# Patient Record
Sex: MALE | Race: WHITE | NOT HISPANIC OR LATINO
[De-identification: names, ages, dates, MRNs, and addresses within clinical notes are randomized per-mention and may not be internally consistent; named-entity substitution may affect disease eponyms.]

---

## 2019-12-24 ENCOUNTER — RESULT REVIEW (OUTPATIENT)
Age: 54
End: 2019-12-24

## 2020-01-02 PROBLEM — Z00.00 ENCOUNTER FOR PREVENTIVE HEALTH EXAMINATION: Status: ACTIVE | Noted: 2020-01-02

## 2020-01-07 ENCOUNTER — RESULT REVIEW (OUTPATIENT)
Age: 55
End: 2020-01-07

## 2020-01-07 ENCOUNTER — APPOINTMENT (OUTPATIENT)
Dept: HEMATOLOGY ONCOLOGY | Facility: CLINIC | Age: 55
End: 2020-01-07
Payer: COMMERCIAL

## 2020-01-07 VITALS
SYSTOLIC BLOOD PRESSURE: 126 MMHG | WEIGHT: 164.99 LBS | BODY MASS INDEX: 24.72 KG/M2 | OXYGEN SATURATION: 95 % | DIASTOLIC BLOOD PRESSURE: 76 MMHG | HEIGHT: 68.7 IN | RESPIRATION RATE: 16 BRPM | HEART RATE: 79 BPM | TEMPERATURE: 98.1 F

## 2020-01-07 DIAGNOSIS — Z82.49 FAMILY HISTORY OF ISCHEMIC HEART DISEASE AND OTHER DISEASES OF THE CIRCULATORY SYSTEM: ICD-10-CM

## 2020-01-07 DIAGNOSIS — Z87.01 PERSONAL HISTORY OF PNEUMONIA (RECURRENT): ICD-10-CM

## 2020-01-07 DIAGNOSIS — Z81.1 FAMILY HISTORY OF ALCOHOL ABUSE AND DEPENDENCE: ICD-10-CM

## 2020-01-07 DIAGNOSIS — Z78.9 OTHER SPECIFIED HEALTH STATUS: ICD-10-CM

## 2020-01-07 DIAGNOSIS — Z82.61 FAMILY HISTORY OF ARTHRITIS: ICD-10-CM

## 2020-01-07 PROCEDURE — 99215 OFFICE O/P EST HI 40 MIN: CPT

## 2020-01-07 RX ORDER — BUDESONIDE AND FORMOTEROL FUMARATE DIHYDRATE 160; 4.5 UG/1; UG/1
160-4.5 AEROSOL RESPIRATORY (INHALATION)
Refills: 0 | Status: ACTIVE | COMMUNITY

## 2020-01-10 ENCOUNTER — RESULT REVIEW (OUTPATIENT)
Age: 55
End: 2020-01-10

## 2020-01-13 ENCOUNTER — RESULT REVIEW (OUTPATIENT)
Age: 55
End: 2020-01-13

## 2020-01-13 ENCOUNTER — APPOINTMENT (OUTPATIENT)
Dept: HEMATOLOGY ONCOLOGY | Facility: CLINIC | Age: 55
End: 2020-01-13
Payer: COMMERCIAL

## 2020-01-13 VITALS
OXYGEN SATURATION: 96 % | HEIGHT: 68.7 IN | WEIGHT: 160.39 LBS | RESPIRATION RATE: 16 BRPM | SYSTOLIC BLOOD PRESSURE: 116 MMHG | TEMPERATURE: 97.9 F | HEART RATE: 74 BPM | BODY MASS INDEX: 24.03 KG/M2 | DIASTOLIC BLOOD PRESSURE: 79 MMHG

## 2020-01-13 PROCEDURE — 99214 OFFICE O/P EST MOD 30 MIN: CPT

## 2020-01-13 NOTE — ASSESSMENT
[FreeTextEntry1] : 54 year male with locally advanced urothelial cancer with congenital solitary kidney.\par \par Pathology report revealed in the right upper lobe fragments of papillary high grade urothelial cancer, PDL-1 pending \par \par CT chest with IV contrast Dec 20, 2019 with widespread bronchiectasis and 8 mm nodule in the RLL.\par \par MRI +/- gadolinium Dec 30, 2019 with right kidney moderate hydronephrosis, no adenopathy.  \par Patient presented with rectal bleeding today.  PLan for colonoscopy.  Restaging PETCT in view of bleeding and solitary pulmonary nodule. \par \par Genetic testing - send to [employer] [facility], pending results.\par \par Mediport placed- healing well. \par \par Patient scheduled to start neoadjuvant MVACdd  today, cycle 1, presented with short episode of rectal bleeding today.\par Colonoscopy scheduled for today - postpone chemotherapy. \par \par PETCT for solitary lung nodule. \par \par \par

## 2020-01-13 NOTE — REVIEW OF SYSTEMS
[Negative] : Allergic/Immunologic [FreeTextEntry7] : rectal bleeding - yesterday, 3 episodes of diarrhea, loose

## 2020-01-13 NOTE — HISTORY OF PRESENT ILLNESS
[de-identified] : 54 year old male presents today for hospital follow up of high grade urothelial cancer.   \par \par Pathology of upper pole biopsy right renal lesion on 12/31/19- superficial fragments of a papillary high grade urothelial carcinoma.   [de-identified] : Patient states he is feeling well. Port placed this Friday 1/10/2020. Patient states he had blood in his stool over the weekend with some clotting as well - PCP recommended to delay chemotherapy. Colonoscopy scheduled for this afternoon. Last colonoscopy done with patient was 50.

## 2020-01-23 ENCOUNTER — RESULT REVIEW (OUTPATIENT)
Age: 55
End: 2020-01-23

## 2020-01-23 ENCOUNTER — APPOINTMENT (OUTPATIENT)
Dept: HEMATOLOGY ONCOLOGY | Facility: CLINIC | Age: 55
End: 2020-01-23
Payer: COMMERCIAL

## 2020-01-23 VITALS
WEIGHT: 159.99 LBS | HEIGHT: 68.7 IN | OXYGEN SATURATION: 98 % | RESPIRATION RATE: 16 BRPM | DIASTOLIC BLOOD PRESSURE: 78 MMHG | TEMPERATURE: 97.9 F | BODY MASS INDEX: 23.97 KG/M2 | HEART RATE: 92 BPM | SYSTOLIC BLOOD PRESSURE: 123 MMHG

## 2020-01-23 DIAGNOSIS — K62.5 HEMORRHAGE OF ANUS AND RECTUM: ICD-10-CM

## 2020-01-23 PROCEDURE — 99215 OFFICE O/P EST HI 40 MIN: CPT

## 2020-01-24 PROBLEM — K62.5 RECTAL BLEEDING: Status: ACTIVE | Noted: 2020-01-13

## 2020-01-24 NOTE — ASSESSMENT
[FreeTextEntry1] : 55 yo male with congenital solitary kidney diagnosed in December 2019 with high grade urothelial cancer of renal pelvis.  We reviewed current data indication benefit from adjuvant approach in high grade upper urothelial cancer. \par \par CT scan and MRI reviewed. \par Patient with solitary lung nodule - will follow up with PETCT\par \par Genetic testing ordered, patient with unknown family history on paternal side.\par \par Reviewed with patient ddMVAC protocol\par ECHO reviewed.\par Mediport placement.\par Side effects and schema of the treatment reviewed with patient and his wife.\par \par Stressed the importance of hydration throughout the treatment - patient's PCP Dr. Igor Gandhi will arrange for hydration at home. \par

## 2020-01-24 NOTE — HISTORY OF PRESENT ILLNESS
[de-identified] : 54 year old male presents today for hospital follow up of high grade urothelial cancer.   \par \par Pathology of upper pole biopsy right renal lesion on 12/31/19- superficial fragments of a papillary high grade urothelial carcinoma.   [de-identified] : Patient experienced hematuria this morning at 0700. Wife states that he also had scant hematuria yesterday, but patient denies. Patient has right flank pain since yesterday evening. Will be seeing Dr. Garcia today.\par \par Uroscopy done last Tuesday.

## 2020-01-24 NOTE — ASSESSMENT
[FreeTextEntry1] : 54 year male with locally advanced urothelial cancer with congenital solitary kidney.\par \par Pathology report revealed in the right upper lobe fragments of papillary high grade urothelial cancer, PDL-1 pending \par \par CT chest with IV contrast Dec 20, 2019 with widespread bronchiectasis and 8 mm nodule in the RLL.\par Colonoscopy Jan 2020- BRIDGER \par MRI +/- gadolinium Dec 30, 2019 with right kidney moderate hydronephrosis, no adenopathy.  \par \par [facility] [place] [facility] [place]\par \par \par Neoadjuvant MVACdd  cycle 1, day 7 \par - episode of hematuria\par - right flank pain - patient with stent, hydration, Valium and plan to see Dr. Garcia\par -  continue hydration \par - labs reviewed- stable kidney function \par \par PETCT for solitary lung nodule. \par \par \par

## 2020-01-24 NOTE — HISTORY OF PRESENT ILLNESS
[de-identified] : 54 year old male presents today for hospital follow up of high grade urothelial cancer.   \par \par Pathology of upper pole biopsy right renal lesion on 12/31/19- superficial fragments of a papillary high grade urothelial carcinoma.  \par Patient has congenital solitary kidney and presented with hematuria.  Cytology revealed high- grade urothelial cancer.  Ureteroscopy of the right kidney showed right upper lobe lesion, patient underwent pigtail placement following fulguration of the lesion. \par Patient desires preservation of the solitary kidney and is a candidate for neoadjuvant treatment with ddMVAC.

## 2020-01-30 ENCOUNTER — APPOINTMENT (OUTPATIENT)
Dept: HEMATOLOGY ONCOLOGY | Facility: CLINIC | Age: 55
End: 2020-01-30
Payer: COMMERCIAL

## 2020-01-30 ENCOUNTER — RESULT REVIEW (OUTPATIENT)
Age: 55
End: 2020-01-30

## 2020-01-30 VITALS
OXYGEN SATURATION: 96 % | WEIGHT: 159 LBS | BODY MASS INDEX: 23.82 KG/M2 | HEIGHT: 68.7 IN | TEMPERATURE: 98.1 F | HEART RATE: 98 BPM | RESPIRATION RATE: 16 BRPM | SYSTOLIC BLOOD PRESSURE: 100 MMHG | DIASTOLIC BLOOD PRESSURE: 67 MMHG

## 2020-01-30 VITALS
BODY MASS INDEX: 23.82 KG/M2 | DIASTOLIC BLOOD PRESSURE: 74 MMHG | HEIGHT: 68.7 IN | OXYGEN SATURATION: 96 % | HEART RATE: 98 BPM | SYSTOLIC BLOOD PRESSURE: 112 MMHG | TEMPERATURE: 98.1 F | WEIGHT: 159 LBS | RESPIRATION RATE: 16 BRPM

## 2020-01-30 DIAGNOSIS — Z87.440 PERSONAL HISTORY OF URINARY (TRACT) INFECTIONS: ICD-10-CM

## 2020-01-30 PROCEDURE — 99214 OFFICE O/P EST MOD 30 MIN: CPT

## 2020-01-31 NOTE — ASSESSMENT
[FreeTextEntry1] : 54 year male with locally advanced urothelial cancer with congenital solitary kidney.\par \par 12/2019Pathology report revealed in the right upper lobe fragments of papillary high grade urothelial cancer, PDL-1 pending \par \par CT chest with IV contrast Dec 20, 2019 with widespread bronchiectasis and 8 mm nodule in the RLL.\par Colonoscopy Jan 2020- BRIGDER \par MRI +/- gadolinium Dec 30, 2019 with right kidney moderate hydronephrosis, no adenopathy.  \par \par [facility] [place] [facility] [place]\par \par Neoadjuvant started 1/13/20 - MVACdd - now on cycle 2\par - hematuria - resolved - home hydration post treatment.\par -1/24/20 UTI - continue with Augmentin\par -Anemia - 2/2 hematuria - s/p  PRBCs transfusion 1/26/20 - Hgb improved. \par - labs reviewed- stable kidney function \par -to have PEt/Ct scan done\par -Ativan called into local pharmacy for anxiety/nausea\par \par rtc in 1 wk for follow up.\par Case and management discussed with Dr. Mcelroy\par \par \par

## 2020-01-31 NOTE — HISTORY OF PRESENT ILLNESS
[de-identified] : Mr. 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   [de-identified] : Pt is here for 2nd treatment with MVAC for urethrial cancer with his wife. Pt was hospitalized at [facility] from 1/24/ to 1/29 for hematuria, worsening of anemia from hematuria, and UTI (Klebsiella pneu). Pt  received 2 units of PRBCs, hematuria resolved, and now on Augmentin for UTI. Pt states his breathing is slightly more labored (pt has bronchiectasis) due to lack of physical activity and mobility because it worsens his hematuria.  Last gross hematuria was 5 days ago. Pt also had a few days of nausea after last treatment that improved with home IVH. He is currently has not taking Valium for anxiety, would like to start Ativan since it's also a great antiemetic.

## 2020-01-31 NOTE — REVIEW OF SYSTEMS
[Negative] : Allergic/Immunologic [Recent Change In Weight] : ~T recent weight change [SOB on Exertion] : shortness of breath during exertion [FreeTextEntry6] : + [FreeTextEntry9] : +walking with walker

## 2020-02-06 ENCOUNTER — APPOINTMENT (OUTPATIENT)
Dept: HEMATOLOGY ONCOLOGY | Facility: CLINIC | Age: 55
End: 2020-02-06

## 2020-02-13 ENCOUNTER — APPOINTMENT (OUTPATIENT)
Dept: HEMATOLOGY ONCOLOGY | Facility: CLINIC | Age: 55
End: 2020-02-13
Payer: COMMERCIAL

## 2020-02-13 VITALS
HEART RATE: 91 BPM | RESPIRATION RATE: 18 BRPM | BODY MASS INDEX: 23.84 KG/M2 | DIASTOLIC BLOOD PRESSURE: 77 MMHG | HEIGHT: 68.7 IN | WEIGHT: 159.1 LBS | SYSTOLIC BLOOD PRESSURE: 121 MMHG | OXYGEN SATURATION: 98 % | TEMPERATURE: 98.2 F

## 2020-02-13 DIAGNOSIS — R31.9 HEMATURIA, UNSPECIFIED: ICD-10-CM

## 2020-02-13 PROCEDURE — 99214 OFFICE O/P EST MOD 30 MIN: CPT

## 2020-02-14 PROBLEM — R31.9: Status: ACTIVE | Noted: 2020-01-24

## 2020-02-14 NOTE — ASSESSMENT
[FreeTextEntry1] : 54 year male with locally advanced urothelial cancer with congenital solitary kidney.\par \par 12/2019Pathology report revealed in the right upper lobe fragments of papillary high grade urothelial cancer, PDL-1 pending \par \par CT chest with IV contrast Dec 20, 2019 with widespread bronchiectasis and 8 mm nodule in the RLL.\par Colonoscopy Jan 2020- BRIDGER \par MRI +/- gadolinium Dec 30, 2019 with right kidney moderate hydronephrosis, no adenopathy.  \par \par Genetic testing - send to [facility] in [place] and [place]\par \par Neoadjuvant started 1/13/20 - MVACdd - s/p cycle 2\par \par - complicated with hematuria, pseudoaneurysm, required embolization, followed by acute renal failure- Cr 6, now improved to 1.44 \par -to have PEt/Ct scan to be done, next week\par -if renal function continues to recover will proceed with cycle 3 and 4 of MVAC\par reviewed with patient protocol for neoadjuvant chemo in upper urothelial cancer.  He has congenital solitary kidney  and faces dialysis for at least 1 to 3 years before he can be considered for transplant.\par Following the chemotherapy he will undergo scope to evaluate response.  If CR can be considered for maintenance with immunotherapy although I stressed to the patient and that there is no randomized data and we can consider this based of experience with several patients in our institution. If there is residual disease nephroureterectomy is indicated. \par \par \par

## 2020-02-14 NOTE — HISTORY OF PRESENT ILLNESS
[de-identified] : Pt is here for 2nd treatment with MVAC for urethrial cancer with his wife and son.  Patient discharged from ICU this Saturday 2/8/2020 for hematuria. Embolization procedure done on Wednesday before, patient states that he has a hard bump at the site of insertion on the right - following up with Dr. Truong this afternoon. [de-identified] : Mr. 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

## 2020-02-14 NOTE — REVIEW OF SYSTEMS
[Recent Change In Weight] : ~T recent weight change [SOB on Exertion] : shortness of breath during exertion [Negative] : Allergic/Immunologic [FreeTextEntry9] : +walking with walker

## 2020-02-18 ENCOUNTER — RESULT REVIEW (OUTPATIENT)
Age: 55
End: 2020-02-18

## 2020-02-24 ENCOUNTER — APPOINTMENT (OUTPATIENT)
Dept: HEMATOLOGY ONCOLOGY | Facility: CLINIC | Age: 55
End: 2020-02-24
Payer: COMMERCIAL

## 2020-02-24 VITALS
DIASTOLIC BLOOD PRESSURE: 70 MMHG | BODY MASS INDEX: 22.92 KG/M2 | OXYGEN SATURATION: 98 % | HEART RATE: 85 BPM | WEIGHT: 153 LBS | HEIGHT: 68.7 IN | RESPIRATION RATE: 18 BRPM | SYSTOLIC BLOOD PRESSURE: 125 MMHG | TEMPERATURE: 98 F

## 2020-02-24 DIAGNOSIS — R63.0 ANOREXIA: ICD-10-CM

## 2020-02-24 PROCEDURE — 99215 OFFICE O/P EST HI 40 MIN: CPT

## 2020-02-26 ENCOUNTER — RESULT REVIEW (OUTPATIENT)
Age: 55
End: 2020-02-26

## 2020-03-03 ENCOUNTER — RESULT REVIEW (OUTPATIENT)
Age: 55
End: 2020-03-03

## 2020-03-03 ENCOUNTER — APPOINTMENT (OUTPATIENT)
Dept: HEMATOLOGY ONCOLOGY | Facility: CLINIC | Age: 55
End: 2020-03-03
Payer: COMMERCIAL

## 2020-03-03 VITALS
HEART RATE: 89 BPM | HEIGHT: 68.7 IN | SYSTOLIC BLOOD PRESSURE: 134 MMHG | BODY MASS INDEX: 23.22 KG/M2 | WEIGHT: 154.98 LBS | DIASTOLIC BLOOD PRESSURE: 73 MMHG | RESPIRATION RATE: 18 BRPM | OXYGEN SATURATION: 99 % | TEMPERATURE: 98.4 F

## 2020-03-03 PROCEDURE — 99214 OFFICE O/P EST MOD 30 MIN: CPT

## 2020-03-03 NOTE — ASSESSMENT
[FreeTextEntry1] : 54 year male with locally advanced urothelial cancer with congenital solitary kidney.\par \par 12/2019Pathology report revealed in the right upper lobe fragments of papillary high grade urothelial cancer,\par - PDL-1 < 10 - negative\par \par CT chest with IV contrast Dec 20, 2019 with widespread bronchiectasis and 8 mm nodule in the RLL.\par Colonoscopy Jan 2020- BRIDGER \par MRI +/- gadolinium Dec 30, 2019 with right kidney moderate hydronephrosis, no adenopathy.  \par \par Genetic testing - send to [facility] in [place] and [place]\par \par Neoadjuvant started 1/13/20 - MVACdd - s/p cycle 3/4 \par \par - complicated with hematuria, pseudoaneurysm, required embolization, followed by acute renal failure- Cr 6, now improved to 1.2 \par -PETCT - reviewed, BRIDGER \par -if renal function continues to recover will proceed with cycle 3 and 4 of MVAC\par reviewed with patient protocol for neoadjuvant chemo in upper urothelial cancer.  He has congenital solitary kidney  and faces dialysis for at least 1 to 3 years before he can be considered for transplant.\par \par Following the chemotherapy he will undergo scope to evaluate response.  If CR can be considered for maintenance with immunotherapy although I stressed to the patient and that there is no randomized data and we can consider this based of experience with several patients in our institution. If there is residual disease nephroureterectomy is indicated. \par \par \par

## 2020-03-03 NOTE — HISTORY OF PRESENT ILLNESS
[de-identified] : Mr. 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   [de-identified] : Pt is here for 2nd treatment with MVAC for urethral cancer with his wife. Patient states he is feeling well - better than before. Anticipating hydration today.

## 2020-03-03 NOTE — REVIEW OF SYSTEMS
[Recent Change In Weight] : ~T recent weight change [SOB on Exertion] : shortness of breath during exertion [Negative] : Heme/Lymph [FreeTextEntry9] : +walking with walker

## 2020-03-06 PROBLEM — R63.0 APPETITE LOSS: Status: ACTIVE | Noted: 2020-02-24

## 2020-03-06 NOTE — ASSESSMENT
[FreeTextEntry1] : 54 year male with locally advanced urothelial cancer with congenital solitary kidney.\par \par 12/2019Pathology report revealed in the right upper lobe fragments of papillary high grade urothelial cancer, PDL-1 pending \par \par CT chest with IV contrast Dec 20, 2019 with widespread bronchiectasis and 8 mm nodule in the RLL.\par Colonoscopy Jan 2020- BRIDGER \par MRI +/- gadolinium Dec 30, 2019 with right kidney moderate hydronephrosis, no adenopathy.  \par \par Genetic testing - send to [facility] in [place] and [place]\par \par Neoadjuvant started 1/13/20 - MVACdd - cycle 3 today\par \par - complicated with hematuria, pseudoaneurysm, required embolization, followed by acute renal failure- Cr 6, now improved to 1.2 \par -to have PEt/Ct scan to be done, next week\par -if renal function continues to recover will proceed with cycle 3 and 4 of MVAC\par reviewed with patient protocol for neoadjuvant chemo in upper urothelial cancer.  He has congenital solitary kidney  and faces dialysis for at least 1 to 3 years before he can be considered for transplant.\par Following the chemotherapy he will undergo scope to evaluate response.  If CR can be considered for maintenance with immunotherapy although I stressed to the patient and that there is no randomized data and we can consider this based of experience with several patients in our institution. If there is residual disease nephroureterectomy is indicated. \par \par \par

## 2020-03-06 NOTE — HISTORY OF PRESENT ILLNESS
[de-identified] : Mr. 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   [de-identified] : Pt is here for 2nd treatment with MVAC for urethral cancer with his wife and son. Patient concerned about the start of chemo today, unsure if it should be held as per Dr. Garcia.

## 2020-03-09 ENCOUNTER — RESULT REVIEW (OUTPATIENT)
Age: 55
End: 2020-03-09

## 2020-03-09 ENCOUNTER — APPOINTMENT (OUTPATIENT)
Dept: HEMATOLOGY ONCOLOGY | Facility: CLINIC | Age: 55
End: 2020-03-09
Payer: COMMERCIAL

## 2020-03-09 VITALS
TEMPERATURE: 98.6 F | WEIGHT: 160 LBS | RESPIRATION RATE: 18 BRPM | OXYGEN SATURATION: 98 % | DIASTOLIC BLOOD PRESSURE: 77 MMHG | HEIGHT: 68.7 IN | BODY MASS INDEX: 23.97 KG/M2 | SYSTOLIC BLOOD PRESSURE: 123 MMHG | HEART RATE: 83 BPM

## 2020-03-09 PROCEDURE — 99215 OFFICE O/P EST HI 40 MIN: CPT

## 2020-03-09 NOTE — HISTORY OF PRESENT ILLNESS
[de-identified] : Mr. 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   [de-identified] : Pt is here for 2nd treatment with MVAC for urethral cancer with his wife. Patient states he is feeling well today.  Denies N/V appetite improved.

## 2020-03-09 NOTE — REVIEW OF SYSTEMS
[Recent Change In Weight] : ~T recent weight change [Negative] : Allergic/Immunologic [SOB on Exertion] : no shortness of breath during exertion [FreeTextEntry2] : weight gain  [FreeTextEntry9] : +walking with walker

## 2020-03-09 NOTE — ASSESSMENT
[FreeTextEntry1] : 55 year male with locally advanced urothelial cancer with congenital solitary kidney.\par \par 12/2019 Pathology report revealed in the right upper lobe fragments of papillary high grade urothelial cancer,\par - PDL-1 < 10 - negative\par \par CT chest with IV contrast Dec 20, 2019 with widespread bronchiectasis and 8 mm nodule in the RLL.\par Colonoscopy Jan 2020- BRIDGER \par MRI +/- gadolinium Dec 30, 2019 with right kidney moderate hydronephrosis, no adenopathy.  \par \par [facility] [place] [facility] [place]\par PD-L1- <10 CPS\par Neoadjuvant started 1/13/20 - MVACdd - s/p cycle 4/4 - 3/9/2020\par \par - complicated with hematuria, pseudoaneurysm, required embolization, followed by acute renal failure- Cr 6, now improved to 1.2 \par -PETCT - reviewed, BRIDGER \par -if renal function continues to recover will proceed with cycle 3 and 4 of MVAC\par \par reviewed with patient protocol for neoadjuvant chemo in upper urothelial cancer.  He has congenital solitary kidney  and faces dialysis for at least 1 to 3 years before he can be considered for transplant.\par \par Following the chemotherapy he will undergo scope to evaluate response.  If CR can be considered for maintenance with immunotherapy although I stressed to the patient and that there is no randomized data and we can consider this based of experience with several patients in our institution. If there is residual disease nephroureterectomy is indicated. \par \par \par

## 2020-03-24 ENCOUNTER — APPOINTMENT (OUTPATIENT)
Dept: HEMATOLOGY ONCOLOGY | Facility: CLINIC | Age: 55
End: 2020-03-24

## 2020-04-06 ENCOUNTER — APPOINTMENT (OUTPATIENT)
Dept: HEMATOLOGY ONCOLOGY | Facility: CLINIC | Age: 55
End: 2020-04-06
Payer: COMMERCIAL

## 2020-04-06 DIAGNOSIS — R10.9 UNSPECIFIED ABDOMINAL PAIN: ICD-10-CM

## 2020-04-06 PROCEDURE — 99214 OFFICE O/P EST MOD 30 MIN: CPT

## 2020-04-06 NOTE — ASSESSMENT
[FreeTextEntry1] : 55 year male with locally advanced urothelial cancer with congenital solitary kidney.\par \par 12/2019 Pathology report revealed in the right upper lobe fragments of papillary high grade urothelial cancer,\par - PDL-1 < 10 - negative\par \par CT chest with IV contrast Dec 20, 2019 with widespread bronchiectasis and 8 mm nodule in the RLL.\par Colonoscopy Jan 2020- BRIDGER \par MRI +/- gadolinium Dec 30, 2019 with right kidney moderate hydronephrosis, no adenopathy.  \par \par Genetic testing - send to [facility] in [place] and [place]\par PD-L1- <10 CPS\par Neoadjuvant started 1/13/20 - MVACdd - s/p cycle 4/4 - 3/9/2020\par \par - complicated with hematuria, pseudoaneurysm, required embolization, followed by acute renal failure- Cr 6, now improved to 1.2 \par -PETCT - reviewed, BRIDGER \par -if renal function continues to recover will proceed with cycle 3 and 4 of MVAC\par \par reviewed with patient protocol for neoadjuvant chemo in upper urothelial cancer.  He has congenital solitary kidney  and faces dialysis for at least 1 to 3 years before he can be considered for transplant.\par \par Following the chemotherapy he will undergo scope to evaluate response.  If CR can be considered for maintenance with immunotherapy although I stressed to the patient and that there is no randomized data and we can consider this based of experience with several patients in our institution. If there is residual disease nephroureterectomy is indicated. \par \par 4/6/2020\par \par Telehealth with patient x 30 min\par - right kidney flank pain, intermittent, seemd better today\par - if persistent, needs UA, labs including CBC, CMP, ESR, CRP, LDH, patient might \par - plan for scope in 4 weeks if possible ( COVID) and if no evidence of disease will proceed with immunotherapy\par - oral hydration, healthy diet\par \par

## 2020-04-06 NOTE — HISTORY OF PRESENT ILLNESS
[Home] : at home, [unfilled] , at the time of the visit. [Medical Office: ([facility])___] : at ~his/her~ medical office located in [place] [Spouse] : spouse [Patient] : the patient [Self] : self [de-identified] : Mr. 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   [de-identified] : Patient c/o mid right flank pain- front, 3/10, increased with movement, sleeps on the left side.  Tylenol.\par stays well hydrated, no hematuria, good appetite.\par Slower recovery after the last treatment up to two weeks, quarantine AT HOME.\par Mouth sores, HA - following HA.\par

## 2020-04-27 ENCOUNTER — APPOINTMENT (OUTPATIENT)
Dept: HEMATOLOGY ONCOLOGY | Facility: CLINIC | Age: 55
End: 2020-04-27

## 2020-05-05 ENCOUNTER — APPOINTMENT (OUTPATIENT)
Dept: HEMATOLOGY ONCOLOGY | Facility: CLINIC | Age: 55
End: 2020-05-05
Payer: COMMERCIAL

## 2020-05-05 DIAGNOSIS — Q60.0 RENAL AGENESIS, UNILATERAL: ICD-10-CM

## 2020-05-05 PROCEDURE — 99214 OFFICE O/P EST MOD 30 MIN: CPT | Mod: 95

## 2020-05-06 PROBLEM — Q60.0 SOLITARY KIDNEY: Status: ACTIVE | Noted: 2020-01-07

## 2020-05-06 NOTE — REVIEW OF SYSTEMS
[Recent Change In Weight] : ~T recent weight change [Negative] : Heme/Lymph [SOB on Exertion] : no shortness of breath during exertion [FreeTextEntry2] : weight gain  [FreeTextEntry9] : +walking with walker

## 2020-05-06 NOTE — ASSESSMENT
[FreeTextEntry1] : 55 year male with locally advanced urothelial cancer with congenital solitary kidney.\par \par 12/2019 Pathology report revealed in the right upper lobe fragments of papillary high grade urothelial cancer,\par - PDL-1 < 10 - negative\par \par CT chest with IV contrast Dec 20, 2019 with widespread bronchiectasis and 8 mm nodule in the RLL.\par Colonoscopy Jan 2020- BRIDGER \par MRI +/- gadolinium Dec 30, 2019 with right kidney moderate hydronephrosis, no adenopathy.  \par \par Genetic testing - send to [facility] in [place] and [place]\par PD-L1- <10 CPS\par Neoadjuvant started 1/13/20 - MVACdd - s/p cycle 4/4 - 3/9/2020\par \par - complicated with hematuria, pseudoaneurysm, required embolization, followed by acute renal failure- Cr 6, now improved to 1.2 \par -PETCT Feb 2020- reviewed, BRIDGER \par \par \par Reviewed with patient protocol for neoadjuvant chemo in upper urothelial cancer.  He has congenital solitary kidney and faces dialysis for at least 1 to 3 years before he can be considered for transplant.\par \par Following the chemotherapy he will undergo scope to evaluate response.  If CR can be considered for maintenance with immunotherapy although I stressed to the patient and that there is no randomized data and we can consider this based of experience with several patients in our institution. If there is residual disease nephroureterectomy is indicated. \par \par s/p MVAC x 4- Jan 17- March 10/ 2020\par \par 5/6/2020\par \par Telehealth with patient x 30 min\par - witnessed by wife\par - patient recovering well from chemotherapy\par labs reviewed with patient - stable renal function, mild anemia - improving\par - needs CEA to repeat during next visit\par - patient underwent cystoscopy - pending cytology\par - he is PDL-1 < 1- considered negative, lower benefit from immunotherapy \par - would postpone immuno for now\par - MRI in 6-8 weeks\par - reviewed genetic testing [facility] results with [facility] - long term follow up\par - d/w Dr Garcia - cystoscopy with partially infarcted kidney following embolization \par \par

## 2020-05-06 NOTE — HISTORY OF PRESENT ILLNESS
[Home] : at home, [unfilled] , at the time of the visit. [Spouse] : spouse [Medical Office: ([facility])___] : at ~his/her~ medical office located in [place] [Patient] : the patient [Self] : self [de-identified] : Mr. 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   [de-identified] : Patient completed ddMVAC x 4- recovering well.  \par Cystoscopy done yesterday by Dr. Garcia - pending cytology report \par  \par  [FreeTextEntry2] : Genaro River

## 2020-05-19 ENCOUNTER — RESULT REVIEW (OUTPATIENT)
Age: 55
End: 2020-05-19

## 2020-05-21 ENCOUNTER — APPOINTMENT (OUTPATIENT)
Dept: HEMATOLOGY ONCOLOGY | Facility: CLINIC | Age: 55
End: 2020-05-21
Payer: COMMERCIAL

## 2020-05-21 ENCOUNTER — RESULT REVIEW (OUTPATIENT)
Age: 55
End: 2020-05-21

## 2020-05-21 PROCEDURE — 99215 OFFICE O/P EST HI 40 MIN: CPT | Mod: 95

## 2020-05-22 NOTE — HISTORY OF PRESENT ILLNESS
[Home] : at home, [unfilled] , at the time of the visit. [Medical Office: ([facility])___] : at ~his/her~ medical office located in [place] [Patient] : the patient [Spouse] : spouse [Self] : self [de-identified] : Mr. 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 \par 55 year male with locally advanced urothelial cancer with congenital solitary kidney.\par \par 12/2019 Pathology report revealed in the right upper lobe fragments of papillary high grade urothelial cancer,\par - PDL-1 < 10 - negative\par \par CT chest with IV contrast Dec 20, 2019 with widespread bronchiectasis and 8 mm nodule in the RLL.\par Colonoscopy Jan 2020- BRIDGER \par MRI +/- gadolinium Dec 30, 2019 with right kidney moderate hydronephrosis, no adenopathy.  \par \par Genetic testing - [facility] in [place] and [place]\par PD-L1- <10 CPS\par Neoadjuvant started 1/13/20 - MVACdd - s/p cycle 4/4 - 3/9/2020\par \par   [de-identified] : Patient completed ddMVAC x 4- recovering well.  \par Cystoscopy done May 4, 2020- negative \par  \par  [FreeTextEntry2] : Genaro River

## 2020-05-22 NOTE — ASSESSMENT
[FreeTextEntry1] : 55 year male with locally advanced urothelial cancer with congenital solitary kidney.\par \par 12/2019 Pathology report revealed in the right upper lobe fragments of papillary high grade urothelial cancer,\par - PDL-1 < 10 - negative\par \par CT chest with IV contrast Dec 20, 2019 with widespread bronchiectasis and 8 mm nodule in the RLL.\par Colonoscopy Jan 2020- BRIDGER \par MRI +/- gadolinium Dec 30, 2019 with right kidney moderate hydronephrosis, no adenopathy.  \par \par Genetic testing - [facility] VUS in BAP1 and WRN\par PD-L1- <10 CPS\par Neoadjuvant started 1/13/20 - MVACdd - s/p cycle 4/4 - 3/9/2020\par \par - complicated with hematuria, pseudoaneurysm, required embolization, followed by acute renal failure- Cr 6, now improved to 1.2 \par -PETCT Feb 2020- reviewed, BRIDGER \par \par \par Reviewed with patient protocol for neoadjuvant chemo in upper urothelial cancer.  He has congenital solitary kidney and faces dialysis for at least 1 to 3 years before he can be considered for transplant.\par \par Following the chemotherapy he will undergo scope to evaluate response.  If CR can be considered for maintenance with immunotherapy although I stressed to the patient and that there is no randomized data and we can consider this based of experience with several patients in our institution. If there is residual disease nephroureterectomy is indicated. \par \par s/p MVAC x 4- Jan 17- March 10/ 2020\par \par 5/21/2020\par Atypical cells from cytology in the beginning of May, followed with cytology from voiding specimen from May 19, negative for high grade malignancy \par CEA - 2.7 \par Telehealth with patient x 30 min\par - witnessed by wife\par - patient recovering well from chemotherapy\par labs reviewed with patient - stable renal function\par - needs CEA to repeat during next visit\par - patient underwent cystoscopy - cytology atypical cells\par - repeated cytology negative\par - required stent after nephrostomy\par - reviewed benefit and risk of immunotherapy- will plan for Pembrolizumab  \par - would postpone immuno for now\par - MRI ab/ pelvis and CT scant chest in 4 weeks\par - reviewed genetic testing [facility] results with VUS - long term follow up\par  \par \par

## 2020-06-15 ENCOUNTER — APPOINTMENT (OUTPATIENT)
Dept: HEMATOLOGY ONCOLOGY | Facility: CLINIC | Age: 55
End: 2020-06-15
Payer: COMMERCIAL

## 2020-06-17 ENCOUNTER — APPOINTMENT (OUTPATIENT)
Dept: HEMATOLOGY ONCOLOGY | Facility: CLINIC | Age: 55
End: 2020-06-17
Payer: COMMERCIAL

## 2020-06-17 PROCEDURE — 99214 OFFICE O/P EST MOD 30 MIN: CPT | Mod: 95

## 2020-06-17 NOTE — HISTORY OF PRESENT ILLNESS
[de-identified] : Mr. 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 \par 55 year male with locally advanced urothelial cancer with congenital solitary kidney.\par \par 12/2019 Pathology report revealed in the right upper lobe fragments of papillary high grade urothelial cancer,\par - PDL-1 < 10 - negative\par \par CT chest with IV contrast Dec 20, 2019 with widespread bronchiectasis and 8 mm nodule in the RLL.\par Colonoscopy Jan 2020- BRIDGER \par MRI +/- gadolinium Dec 30, 2019 with right kidney moderate hydronephrosis, no adenopathy.  \par \par Genetic testing - [facility] VUS in BAP1 and WRN\par PD-L1- <10 CPS\par Neoadjuvant started 1/13/20 - MVACdd - s/p cycle 4/4 - 3/9/2020\par \par   [de-identified] : Patient completed ddMVAC x 4- recovering well.  \par Cystoscopy done May 4, 2020- negative \par  \par  [FreeTextEntry2] : Genaro River

## 2020-06-17 NOTE — ASSESSMENT
[FreeTextEntry1] : 55 year male with locally advanced urothelial cancer with congenital solitary kidney.\par \par 12/2019 Pathology report revealed in the right upper lobe fragments of papillary high grade urothelial cancer,\par - PDL-1 < 10 - negative\par \par CT chest with IV contrast Dec 20, 2019 with widespread bronchiectasis and 8 mm nodule in the RLL.\par Colonoscopy Jan 2020- BRIDGER \par MRI +/- gadolinium Dec 30, 2019 with right kidney moderate hydronephrosis, no adenopathy.  \par \par Genetic testing - [facility] VUS in BAP1 and WRN\par PD-L1- <10 CPS\par Neoadjuvant started 1/13/20 - MVACdd - s/p cycle 4/4 - 3/9/2020\par \par - complicated with hematuria, pseudoaneurysm, required embolization, followed by acute renal failure- Cr 6, now improved to 1.2 \par -PETCT Feb 2020- reviewed, BRIDGER \par \par \par Reviewed with patient protocol for neoadjuvant chemo in upper urothelial cancer.  He has congenital solitary kidney and faces dialysis for at least 1 to 3 years before he can be considered for transplant.\par \par Following the chemotherapy he will undergo scope to evaluate response.  If CR can be considered for maintenance with immunotherapy although I stressed to the patient and that there is no randomized data and we can consider this based of experience with several patients in our institution. If there is residual disease nephroureterectomy is indicated. \par \par s/p MVAC x 4- Jan 17- March 10/ 2020\par \par 5/21/2020\par Atypical cells from cytology in the beginning of May, followed with cytology from voiding specimen from May 19, negative for high grade malignancy \par CEA - 2.7 \par Telehealth with patient x 26 min\par - witnessed by wife\par - patient recovering well from chemotherapy\par labs reviewed with patient - stable renal function\par - needs CEA to repeat during next visit\par -reviewed with patient MRI and CT scan of the chest - BRIDGER at this point \par - reviewed with patient current findings and indication for close monitoring\par - immunotherapy side effects reviewed with patient - defer tx for now\par

## 2020-06-17 NOTE — REVIEW OF SYSTEMS
[FreeTextEntry9] : +walking with walker [SOB on Exertion] : no shortness of breath during exertion [FreeTextEntry2] : weight gain

## 2020-07-01 ENCOUNTER — RESULT REVIEW (OUTPATIENT)
Age: 55
End: 2020-07-01

## 2020-07-01 ENCOUNTER — APPOINTMENT (OUTPATIENT)
Dept: HEMATOLOGY ONCOLOGY | Facility: CLINIC | Age: 55
End: 2020-07-01
Payer: COMMERCIAL

## 2020-07-01 VITALS
HEIGHT: 68.7 IN | RESPIRATION RATE: 18 BRPM | HEART RATE: 68 BPM | DIASTOLIC BLOOD PRESSURE: 75 MMHG | OXYGEN SATURATION: 98 % | TEMPERATURE: 98.2 F | SYSTOLIC BLOOD PRESSURE: 111 MMHG | BODY MASS INDEX: 25.92 KG/M2 | WEIGHT: 173 LBS

## 2020-07-01 PROCEDURE — 99499A: CUSTOM | Mod: NC

## 2020-08-07 ENCOUNTER — RESULT REVIEW (OUTPATIENT)
Age: 55
End: 2020-08-07

## 2020-08-19 ENCOUNTER — RESULT REVIEW (OUTPATIENT)
Age: 55
End: 2020-08-19

## 2020-08-19 ENCOUNTER — APPOINTMENT (OUTPATIENT)
Dept: HEMATOLOGY ONCOLOGY | Facility: CLINIC | Age: 55
End: 2020-08-19
Payer: COMMERCIAL

## 2020-08-19 VITALS
WEIGHT: 145 LBS | TEMPERATURE: 97.4 F | HEART RATE: 69 BPM | BODY MASS INDEX: 21.72 KG/M2 | DIASTOLIC BLOOD PRESSURE: 65 MMHG | HEIGHT: 68.7 IN | SYSTOLIC BLOOD PRESSURE: 108 MMHG | RESPIRATION RATE: 18 BRPM | OXYGEN SATURATION: 96 %

## 2020-08-19 DIAGNOSIS — K21.9 GASTRO-ESOPHAGEAL REFLUX DISEASE W/OUT ESOPHAGITIS: ICD-10-CM

## 2020-08-19 PROCEDURE — 99214 OFFICE O/P EST MOD 30 MIN: CPT

## 2020-08-19 RX ORDER — LORAZEPAM 0.5 MG/1
0.5 TABLET ORAL
Qty: 15 | Refills: 0 | Status: COMPLETED | COMMUNITY
Start: 2020-01-31 | End: 2020-08-19

## 2020-08-19 RX ORDER — PROCHLORPERAZINE MALEATE 10 MG/1
10 TABLET ORAL EVERY 6 HOURS
Qty: 30 | Refills: 1 | Status: COMPLETED | COMMUNITY
Start: 2020-01-17 | End: 2020-08-19

## 2020-08-19 RX ORDER — DOCUSATE SODIUM 100 MG/1
100 CAPSULE ORAL
Refills: 0 | Status: COMPLETED | COMMUNITY
End: 2020-08-19

## 2020-08-19 RX ORDER — DEXAMETHASONE 4 MG/1
4 TABLET ORAL DAILY
Qty: 30 | Refills: 3 | Status: COMPLETED | COMMUNITY
Start: 2020-01-17 | End: 2020-08-19

## 2020-08-19 RX ORDER — MIRTAZAPINE 7.5 MG/1
7.5 TABLET, FILM COATED ORAL
Qty: 30 | Refills: 2 | Status: COMPLETED | COMMUNITY
Start: 2020-02-24 | End: 2020-08-19

## 2020-08-19 RX ORDER — DRONABINOL 2.5 MG/1
2.5 CAPSULE ORAL
Qty: 60 | Refills: 0 | Status: COMPLETED | COMMUNITY
Start: 2020-02-24 | End: 2020-08-19

## 2020-08-19 RX ORDER — ONDANSETRON 4 MG/1
4 TABLET, ORALLY DISINTEGRATING ORAL
Qty: 20 | Refills: 0 | Status: COMPLETED | COMMUNITY
Start: 2020-01-17 | End: 2020-08-19

## 2020-08-21 NOTE — ASSESSMENT
[FreeTextEntry1] : 55 year male with locally advanced urothelial cancer with congenital solitary kidney.\par \par 12/2019 Pathology report revealed in the right upper lobe fragments of papillary high grade urothelial cancer,\par - PDL-1 < 10 - negative\par \par CT chest with IV contrast Dec 20, 2019 with widespread bronchiectasis and 8 mm nodule in the RLL.\par Colonoscopy Jan 2020- BRIDGER \par MRI +/- gadolinium Dec 30, 2019 with right kidney moderate hydronephrosis, no adenopathy.  \par \par Genetic testing - [facility] VUS in BAP1 and WRN\par PD-L1- <10 CPS\par Neoadjuvant started 1/13/20 - MVACdd - s/p cycle 4/4 - 3/9/2020\par \par - complicated with hematuria, pseudoaneurysm, required embolization, followed by acute renal failure- Cr 6, now improved to 1.2 \par - PETCT Feb 2020- reviewed, BRIDGER \par - cystoscopy - BRIDGER \par - keep mediport for now, flush q 3 months\par \par \par Reviewed with patient protocol for neoadjuvant chemo in upper urothelial cancer.  He has congenital solitary kidney and faces dialysis for at least 1 to 3 years before he can be considered for transplant.\par \par Following the chemotherapy he will undergo scope to evaluate response.  If CR can be considered for maintenance with immunotherapy although I stressed to the patient and that there is no randomized data and we can consider this based of experience with several patients in our institution. If there is residual disease nephroureterectomy is indicated. \par \par s/p MVAC x 4- Jan 17- March 10/ 2020\par \par - labs reviewed with patient - stable renal function\par -needs CEA to repeat during next visit\par -reviewed with patient MRI and CT scan of the chest - BRIDGER at this point \par - reviewed with patient current findings and indication for close monitoring\par - immunotherapy side effects reviewed with patient - defer tx for now\par

## 2020-08-21 NOTE — HISTORY OF PRESENT ILLNESS
[Home] : at home, [unfilled] , at the time of the visit. [Medical Office: ([facility])___] : at ~his/her~ medical office located [place] [Spouse] : spouse [Self] : self [Patient] : the patient [FreeTextEntry2] : Genaro River  [de-identified] : Patient presents today for urothelial cancer- had follow up cystoscopy Aug 7th with Dr. Garcia.  Feeling well exercising eating well.   [de-identified] : Mr. 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 \par 55 year male with locally advanced urothelial cancer with congenital solitary kidney.\par \par 12/2019 Pathology report revealed in the right upper lobe fragments of papillary high grade urothelial cancer,\par - PDL-1 < 10 - negative\par \par CT chest with IV contrast Dec 20, 2019 with widespread bronchiectasis and 8 mm nodule in the RLL.\par Colonoscopy Jan 2020- BRIDGER \par MRI +/- gadolinium Dec 30, 2019 with right kidney moderate hydronephrosis, no adenopathy.  \par \par Genetic testing - [facility] VUS in BAP1 and WRN\par PD-L1- <10 CPS\par Neoadjuvant started 1/13/20 - MVACdd - s/p cycle 4/4 - 3/9/2020\par \par

## 2020-08-21 NOTE — REVIEW OF SYSTEMS
[Negative] : Allergic/Immunologic [SOB on Exertion] : no shortness of breath during exertion [Recent Change In Weight] : ~T no recent weight change [FreeTextEntry9] : +walking with walker

## 2020-11-30 ENCOUNTER — APPOINTMENT (OUTPATIENT)
Dept: HEMATOLOGY ONCOLOGY | Facility: CLINIC | Age: 55
End: 2020-11-30
Payer: COMMERCIAL

## 2020-11-30 ENCOUNTER — RESULT REVIEW (OUTPATIENT)
Age: 55
End: 2020-11-30

## 2020-11-30 VITALS
HEIGHT: 68.7 IN | DIASTOLIC BLOOD PRESSURE: 71 MMHG | RESPIRATION RATE: 18 BRPM | BODY MASS INDEX: 26.94 KG/M2 | HEART RATE: 65 BPM | WEIGHT: 179.8 LBS | SYSTOLIC BLOOD PRESSURE: 124 MMHG | OXYGEN SATURATION: 96 % | TEMPERATURE: 97.7 F

## 2020-11-30 DIAGNOSIS — Z11.59 ENCOUNTER FOR SCREENING FOR OTHER VIRAL DISEASES: ICD-10-CM

## 2020-11-30 PROCEDURE — 99214 OFFICE O/P EST MOD 30 MIN: CPT

## 2020-11-30 PROCEDURE — 99072 ADDL SUPL MATRL&STAF TM PHE: CPT

## 2020-12-02 ENCOUNTER — RESULT REVIEW (OUTPATIENT)
Age: 55
End: 2020-12-02

## 2020-12-23 PROBLEM — Z87.440 HISTORY OF URINARY TRACT INFECTION: Status: RESOLVED | Noted: 2020-01-31 | Resolved: 2020-12-23

## 2021-01-06 NOTE — REVIEW OF SYSTEMS
[Recent Change In Weight] : ~T no recent weight change [SOB on Exertion] : no shortness of breath during exertion [FreeTextEntry9] : +walking with walker

## 2021-01-06 NOTE — ASSESSMENT
[FreeTextEntry1] : 55 year male with locally advanced urothelial cancer with congenital solitary kidney.\par 12/2019 Pathology report revealed in the right upper lobe fragments of papillary high grade urothelial cancer,\par - PDL-1 < 10 - negative\par \par CT chest with IV contrast Dec 20, 2019 with widespread bronchiectasis and 8 mm nodule in the RLL.\par Colonoscopy Jan 2020- BRIDGER \par MRI +/- gadolinium Dec 30, 2019 with right kidney moderate hydronephrosis, no adenopathy.  \par \par Genetic testing - [facility] VUS in BAP1 and WRN\par PD-L1- <10 CPS\par Neoadjuvant started 1/13/20 - MVACdd - s/p cycle 4/4 - 3/9/2020\par \par - complicated with hematuria, pseudoaneurysm, required embolization, followed by acute renal failure- Cr 6, now improved to 1.2 \par - PETCT Feb 2020- reviewed, BRIDGER \par - schedule for CT scan Dec 2020\par - cystoscopy - BRIDGER \par - keep mediport for now, flush q 3 months\par \par s/p MVAC x 4- Jan 17- March 10/ 2020\par \par - labs reviewed with patient - stable renal function\par -needs CEA to repeat during next visit\par -reviewed with patient MRI and CT scan of the chest -October 2020 BRIDGER at this point \par - reviewed with patient current findings and indication for close monitoring\par - immunotherapy side effects reviewed with patient - defer tx for now\par - monitor the counts \par

## 2021-01-06 NOTE — HISTORY OF PRESENT ILLNESS
[de-identified] : Mr. 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 \par 55 year male with locally advanced urothelial cancer with congenital solitary kidney.\par \par 12/2019 Pathology report revealed in the right upper lobe fragments of papillary high grade urothelial cancer,\par - PDL-1 < 10 - negative\par \par CT chest with IV contrast Dec 20, 2019 with widespread bronchiectasis and 8 mm nodule in the RLL.\par Colonoscopy Jan 2020- BRIDGER \par MRI +/- gadolinium Dec 30, 2019 with right kidney moderate hydronephrosis, no adenopathy.  \par \par Genetic testing - [facility] VUS in BAP1 and WRN\par PD-L1- <10 CPS\par Neoadjuvant started 1/13/20 - MVACdd - s/p cycle 4/4 - 3/9/2020\par \par   [de-identified] : Patient presents today for urothelial cancer- had follow up cystoscopy Aug 7th with Dr. Garcia.  Feeling well exercising eating well.

## 2021-03-03 ENCOUNTER — RESULT REVIEW (OUTPATIENT)
Age: 56
End: 2021-03-03

## 2021-03-03 ENCOUNTER — APPOINTMENT (OUTPATIENT)
Dept: HEMATOLOGY ONCOLOGY | Facility: CLINIC | Age: 56
End: 2021-03-03
Payer: COMMERCIAL

## 2021-03-03 VITALS
BODY MASS INDEX: 26.67 KG/M2 | WEIGHT: 178 LBS | OXYGEN SATURATION: 96 % | SYSTOLIC BLOOD PRESSURE: 113 MMHG | TEMPERATURE: 98.4 F | RESPIRATION RATE: 16 BRPM | DIASTOLIC BLOOD PRESSURE: 75 MMHG | HEIGHT: 68.7 IN | HEART RATE: 66 BPM

## 2021-03-03 DIAGNOSIS — R06.02 SHORTNESS OF BREATH: ICD-10-CM

## 2021-03-03 PROCEDURE — 99072 ADDL SUPL MATRL&STAF TM PHE: CPT

## 2021-03-03 PROCEDURE — 99214 OFFICE O/P EST MOD 30 MIN: CPT

## 2021-03-03 NOTE — HISTORY OF PRESENT ILLNESS
[de-identified] : Mr. 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 \par 55 year male with locally advanced urothelial cancer with congenital solitary kidney.\par \par 12/2019 Pathology report revealed in the right upper lobe fragments of papillary high grade urothelial cancer,\par - PDL-1 < 10 - negative\par \par CT chest with IV contrast Dec 20, 2019 with widespread bronchiectasis and 8 mm nodule in the RLL.\par Colonoscopy Jan 2020- BRIDGER \par MRI +/- gadolinium Dec 30, 2019 with right kidney moderate hydronephrosis, no adenopathy.  \par \par Genetic testing - [facility] VUS in BAP1 and WRN\par PD-L1- <10 CPS\par Neoadjuvant started 1/13/20 - MVACdd - s/p cycle 4/4 - 3/9/2020\par \par   [de-identified] : Patient presents today for urothelial cancer. Patient states he is feeling well. No physical complaints at this time.

## 2021-03-03 NOTE — REVIEW OF SYSTEMS
[Negative] : Allergic/Immunologic [Cough] : cough [Fatigue] : fatigue [Recent Change In Weight] : ~T no recent weight change [SOB on Exertion] : no shortness of breath during exertion [FreeTextEntry2] : still fatigues, takes daily naps [FreeTextEntry5] : "cardiovascular capicity is down 30% on pel[place] compared to prechemo., sobe with climbing stairs [FreeTextEntry6] : chronic cough, sobe with climbing stairs,

## 2021-03-03 NOTE — ASSESSMENT
[FreeTextEntry1] : 56 year male with locally advanced urothelial cancer with congenital solitary kidney.\par 12/2019 Pathology report revealed in the right upper lobe fragments of papillary high grade urothelial cancer,\par - PDL-1 < 10 - negative\par \par CT chest with IV contrast Dec 20, 2019 with widespread bronchiectasis and 8 mm nodule in the RLL.\par Colonoscopy Jan 2020- BRIDGER \par MRI +/- gadolinium Dec 30, 2019 with right kidney moderate hydronephrosis, no adenopathy.  \par \par Genetic testing - [facility] VUS in BAP1 and WRN\par PD-L1- <10 CPS\par Neoadjuvant started 1/13/20 - MVACdd - s/p cycle 4/4 - 3/9/2020\par \par - complicated with hematuria, pseudoaneurysm, required embolization, followed by acute renal failure- Cr 6, now improved to 1.2 \par - PETCT Feb 2020- reviewed, BRIDGER \par - schedule for CT scan Dec 2020\par - cystoscopy - BRIDGER \par - keep mediport for now, flush q 3 months\par \par s/p MVAC x 4- Jan 17- March 10/ 2020\par \par - labs reviewed with patient - stable renal function\par -needs CEA to repeat during next visit\par -reviewed with patient MRI and CT scan of the chest -October 2020 BRIDGER at this point \par - reviewed with patient current findings and indication for close monitoring\par - immunotherapy side effects reviewed with patient - defer tx for now\par - monitor the counts \par \par 3/3/21\par overall feels well, but fatigued.  \par states cardiovascular output is 30% less with [place] bike\par plan echo, cardiac workup\par Cardiologist- Dr Buck Batista, [place]  [phone]\par more chest congestion productive cough brown with bronchiectasis\par SOB when climbing stairs. \par December 2020- cystoscopy, BRIDGER\par Imaging due April 2021\par

## 2021-04-27 ENCOUNTER — RESULT REVIEW (OUTPATIENT)
Age: 56
End: 2021-04-27

## 2021-05-26 ENCOUNTER — RESULT REVIEW (OUTPATIENT)
Age: 56
End: 2021-05-26

## 2021-06-07 ENCOUNTER — APPOINTMENT (OUTPATIENT)
Dept: HEMATOLOGY ONCOLOGY | Facility: CLINIC | Age: 56
End: 2021-06-07
Payer: COMMERCIAL

## 2021-06-07 ENCOUNTER — RESULT REVIEW (OUTPATIENT)
Age: 56
End: 2021-06-07

## 2021-06-07 VITALS
BODY MASS INDEX: 26.07 KG/M2 | HEIGHT: 68.7 IN | DIASTOLIC BLOOD PRESSURE: 64 MMHG | RESPIRATION RATE: 18 BRPM | WEIGHT: 174 LBS | TEMPERATURE: 97.4 F | HEART RATE: 60 BPM | SYSTOLIC BLOOD PRESSURE: 106 MMHG | OXYGEN SATURATION: 96 %

## 2021-06-07 PROCEDURE — 99214 OFFICE O/P EST MOD 30 MIN: CPT

## 2021-06-07 PROCEDURE — 99072 ADDL SUPL MATRL&STAF TM PHE: CPT

## 2021-06-07 RX ORDER — AZITHROMYCIN 250 MG/1
250 TABLET, FILM COATED ORAL
Qty: 6 | Refills: 0 | Status: COMPLETED | COMMUNITY
Start: 2021-04-22

## 2021-06-07 RX ORDER — CEFUROXIME AXETIL 500 MG/1
500 TABLET ORAL
Qty: 14 | Refills: 0 | Status: COMPLETED | COMMUNITY
Start: 2021-05-24

## 2021-06-07 NOTE — HISTORY OF PRESENT ILLNESS
[de-identified] : Mr. 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 \par 55 year male with locally advanced urothelial cancer with congenital solitary kidney.\par \par 12/2019 Pathology report revealed in the right upper lobe fragments of papillary high grade urothelial cancer,\par - PDL-1 < 10 - negative\par \par CT chest with IV contrast Dec 20, 2019 with widespread bronchiectasis and 8 mm nodule in the RLL.\par Colonoscopy Jan 2020- BRIDGER \par MRI +/- gadolinium Dec 30, 2019 with right kidney moderate hydronephrosis, no adenopathy.  \par \par Genetic testing - [facility] VUS in BAP1 and WRN\par PD-L1- <10 CPS\par Neoadjuvant started 1/13/20 - MVACdd - s/p cycle 4/4 - 3/9/2020\par \par   [de-identified] : Patient presents today for urothelial cancer. Patient states he is feeling well. No physical complaints at this time.  He followed up with cardiologist and pulmonologist

## 2021-06-07 NOTE — ASSESSMENT
[FreeTextEntry1] : 56 year male with locally advanced urothelial cancer with congenital solitary kidney.\par 12/2019 Pathology report revealed in the right upper lobe fragments of papillary high grade urothelial cancer,\par - PDL-1 < 10 - negative\par \par CT chest with IV contrast Dec 20, 2019 with widespread bronchiectasis and 8 mm nodule in the RLL.\par Colonoscopy Jan 2020- BRIDGER \par MRI +/- gadolinium Dec 30, 2019 with right kidney moderate hydronephrosis, no adenopathy.  \par \par Genetic testing - [facility] VUS in BAP1 and WRN\par PD-L1- <10 CPS\par Neoadjuvant started 1/13/20 - MVACdd - s/p cycle 4/4 - 3/9/2020\par \par - complicated with hematuria, pseudoaneurysm, required embolization, followed by acute renal failure- Cr 6, now improved to 1.2 \par - PETCT Feb 2020- reviewed, BRIDGER \par - schedule for CT scan Dec 2020\par - cystoscopy - BRIDGER \par - keep mediport for now, flush q 3 months\par \par s/p MVAC x 4- Jan 17- March 10/ 2020\par \par - labs reviewed with patient - stable renal function\par -needs CEA to repeat during next visit\par -reviewed with patient MRI and CT scan of the chest -October 2020 BRIDGER at this point \par - reviewed with patient current findings and indication for close monitoring\par - immunotherapy side effects reviewed with patient - defer tx for now\par - monitor the counts \par \par 3/3/21\par overall feels well, but fatigued.  \par states cardiovascular output is 30% less with [place] bike\par plan echo, cardiac workup\par Cardiologist- Dr Buck [place], [place]  [phone]\par more chest congestion productive cough brown with bronchiectasis\par SOB when climbing stairs. \par December 2020- cystoscopy, BRIDGER\par Imaging due April 2021\par \par 6/7/2021\par sputum sample Dr Staples [facility] - sputum cultures pending \par scoped 5/26- urine cytology- all negative \par decreased EF recently 45% EF\par Cytology atypical cells, biopsy negative\par Images 3/ 2021- next scan in 10/ 2021\par

## 2021-06-07 NOTE — REVIEW OF SYSTEMS
[Fatigue] : fatigue [Cough] : cough [Negative] : Allergic/Immunologic [Recent Change In Weight] : ~T no recent weight change [SOB on Exertion] : no shortness of breath during exertion [FreeTextEntry5] : "cardiovascular capicity is down 30% on [place] compared to prechemo., sobe with climbing stairs [FreeTextEntry6] : chronic cough, sobe with climbing stairs,

## 2021-09-13 ENCOUNTER — RESULT REVIEW (OUTPATIENT)
Age: 56
End: 2021-09-13

## 2021-09-13 ENCOUNTER — APPOINTMENT (OUTPATIENT)
Dept: HEMATOLOGY ONCOLOGY | Facility: CLINIC | Age: 56
End: 2021-09-13
Payer: COMMERCIAL

## 2021-09-13 VITALS
RESPIRATION RATE: 16 BRPM | TEMPERATURE: 97.4 F | SYSTOLIC BLOOD PRESSURE: 102 MMHG | HEIGHT: 68.7 IN | OXYGEN SATURATION: 96 % | BODY MASS INDEX: 26.13 KG/M2 | WEIGHT: 174.4 LBS | DIASTOLIC BLOOD PRESSURE: 64 MMHG | HEART RATE: 63 BPM

## 2021-09-13 PROCEDURE — 99214 OFFICE O/P EST MOD 30 MIN: CPT

## 2021-09-13 NOTE — ASSESSMENT
[FreeTextEntry1] : 56 year male with locally advanced urothelial cancer with congenital solitary kidney.\par 12/2019 Pathology report revealed in the right upper lobe fragments of papillary high grade urothelial cancer,\par - PDL-1 < 10 - negative\par \par CT chest with IV contrast Dec 20, 2019 with widespread bronchiectasis and 8 mm nodule in the RLL.\par Colonoscopy Jan 2020- BRIDGER \par MRI +/- gadolinium Dec 30, 2019 with right kidney moderate hydronephrosis, no adenopathy.  \par \par Genetic testing - [facility] VUS in BAP1 and WRN\par PD-L1- <10 CPS\par Neoadjuvant started 1/13/20 - MVACdd - s/p cycle 4/4 - 3/9/2020\par \par - complicated with hematuria, pseudoaneurysm, required embolization, followed by acute renal failure- Cr 6, now improved to 1.2 \par - PETCT Feb 2020- reviewed, BRIDGER \par - schedule for CT scan Dec 2020\par - cystoscopy - BRIDGER \par - keep mediport for now, flush q 3 months\par \par s/p MVAC x 4- Jan 17- March 10/ 2020\par \par - labs reviewed with patient - stable renal function\par -needs CEA to repeat during next visit\par -reviewed with patient MRI and CT scan of the chest -October 2020 BRIDGER at this point \par - reviewed with patient current findings and indication for close monitoring\par - immunotherapy side effects reviewed with patient - defer tx for now\par - monitor the counts \par \par 3/3/21\par overall feels well, but fatigued.  \par states cardiovascular output is 30% less with [place] bike\par plan echo, cardiac workup\par Cardiologist- Dr Buck [place], [place]  [phone]\par more chest congestion productive cough brown with bronchiectasis\par SOB when climbing stairs. \par December 2020- cystoscopy, BRIDGER\par Imaging due April 2021\par \par 6/7/2021\par sputum sample Dr Staples [facility] - sputum cultures pending \par scoped 5/26- urine cytology- all negative \par decreased EF recently 45% EF\par Cytology atypical cells, biopsy negative\par Images 3/ 2021- next scan in 10/ 2021\par \par 9/13/2021\par Intermittent hemoptysis with bronchiectasis, better with antibiotics. \par Scheduled for bronchoscopy Sept 30\par Evaluate immune system - IgG, immunoglobulins, IgG subclasses and Strep pneumonia vaccination\par MRI due abd/ pelvis \par s/p booster \par \par \par \par

## 2021-09-13 NOTE — HISTORY OF PRESENT ILLNESS
[de-identified] : Mr. 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 \par 55 year male with locally advanced urothelial cancer with congenital solitary kidney.\par \par 12/2019 Pathology report revealed in the right upper lobe fragments of papillary high grade urothelial cancer,\par - PDL-1 < 10 - negative\par \par CT chest with IV contrast Dec 20, 2019 with widespread bronchiectasis and 8 mm nodule in the RLL.\par Colonoscopy Jan 2020- BRIDGER \par MRI +/- gadolinium Dec 30, 2019 with right kidney moderate hydronephrosis, no adenopathy.  \par \par Genetic testing - [facility] VUS in BAP1 and WRN\par PD-L1- <10 CPS\par Neoadjuvant started 1/13/20 - MVACdd - s/p cycle 4/4 - 3/9/2020\par \par   [de-identified] : Patient presents today for urothelial cancer. Patient states he is feeling well. No physical complaints at this time.  He followed up with cardiologist and pulmonologist

## 2021-10-01 ENCOUNTER — NON-APPOINTMENT (OUTPATIENT)
Age: 56
End: 2021-10-01

## 2021-11-02 ENCOUNTER — TRANSCRIPTION ENCOUNTER (OUTPATIENT)
Age: 56
End: 2021-11-02

## 2021-11-02 ENCOUNTER — APPOINTMENT (OUTPATIENT)
Dept: HEMATOLOGY ONCOLOGY | Facility: CLINIC | Age: 56
End: 2021-11-02

## 2021-12-02 ENCOUNTER — APPOINTMENT (OUTPATIENT)
Dept: HEMATOLOGY ONCOLOGY | Facility: CLINIC | Age: 56
End: 2021-12-02
Payer: COMMERCIAL

## 2021-12-02 ENCOUNTER — RESULT REVIEW (OUTPATIENT)
Age: 56
End: 2021-12-02

## 2021-12-02 VITALS
BODY MASS INDEX: 25.99 KG/M2 | DIASTOLIC BLOOD PRESSURE: 62 MMHG | RESPIRATION RATE: 16 BRPM | SYSTOLIC BLOOD PRESSURE: 101 MMHG | HEART RATE: 68 BPM | TEMPERATURE: 97.4 F | OXYGEN SATURATION: 96 % | HEIGHT: 68.7 IN | WEIGHT: 173.5 LBS

## 2021-12-02 PROCEDURE — 99214 OFFICE O/P EST MOD 30 MIN: CPT | Mod: 25

## 2021-12-02 PROCEDURE — 36415 COLL VENOUS BLD VENIPUNCTURE: CPT

## 2021-12-02 NOTE — HISTORY OF PRESENT ILLNESS
[de-identified] : Mr. 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 \par 55 year male with locally advanced urothelial cancer with congenital solitary kidney.\par \par 12/2019 Pathology report revealed in the right upper lobe fragments of papillary high grade urothelial cancer,\par - PDL-1 < 10 - negative\par \par CT chest with IV contrast Dec 20, 2019 with widespread bronchiectasis and 8 mm nodule in the RLL.\par Colonoscopy Jan 2020- BRIDGER \par MRI +/- gadolinium Dec 30, 2019 with right kidney moderate hydronephrosis, no adenopathy.  \par \par Genetic testing - [facility] VUS in BAP1 and WRN\par PD-L1- <10 CPS\par Neoadjuvant started 1/13/20 - MVACdd - s/p cycle 4/4 - 3/9/2020\par \par   [de-identified] : Patient presents today for urothelial cancer. Patient states he is feeling well. No physical complaints at this time.  He followed up with cardiologist and pulmonologist.\par Pt on Azithromycin 3 x a week for Hemoptysis.\par

## 2021-12-02 NOTE — ASSESSMENT
[FreeTextEntry1] : 56 year male with locally advanced urothelial cancer with congenital solitary kidney.\par 12/2019 Pathology report revealed in the right upper lobe fragments of papillary high grade urothelial cancer,\par - PDL-1 < 10 - negative\par \par CT chest with IV contrast Dec 20, 2019 with widespread bronchiectasis and 8 mm nodule in the RLL.\par Colonoscopy Jan 2020- [facility] \par MRI +/- gadolinium Dec 30, 2019 with right kidney moderate hydronephrosis, no adenopathy.  \par \par Genetic testing - [employer] VUS in BAP1 and WRN\par PD-L1- <10 CPS\par Neoadjuvant started 1/13/20 - MVACdd - s/p cycle 4/4 - 3/9/2020\par \par - complicated with hematuria, pseudoaneurysm, required embolization, followed by acute renal failure- Cr 6, now improved to 1.2 \par - PETCT Feb 2020- reviewed, BR[facility]GER \par - schedule for CT scan Dec 2020\par - cystoscopy - BR[facility]GER \par - keep mediport for now, flush q 3 months\par \par s/p MVAC x 4- Jan 17- March 10/ 2020\par \par - labs reviewed with patient - stable renal function\par -needs CEA to repeat during next visit\par -reviewed with patient MRI and CT scan of the chest -October 2020 BRIDGER at this point \par - reviewed with patient current findings and indication for close monitoring\par - immunotherapy side effects reviewed with patient - defer tx for now\par - monitor the counts \par \par 3/3/21\par overall feels well, but fatigued.  \par states cardiovascular output is 30% less with [place] bike\par plan echo, cardiac workup\par Cardiologist- Dr Buck [place], [place]  [phone]\par more chest congestion productive cough brown with bronchiectasis\par SOB when climbing stairs. \par December 2020- cystoscopy, BRIDGER\par Imaging due April 2021\par \par 6/7/2021\par sputum sample Dr Staples [facility] - sputum cultures pending \par scoped 5/26- urine cytology- all negative \par decreased EF recently 45% EF\par Cytology atypical cells, biopsy negative\par Images 3/ 2021- next scan in 10/ 2021\par \par 9/13/2021\par Intermittent hemoptysis with bronchiectasis, better with antibiotics. \par Scheduled for bronchoscopy Sept 30\par Evaluate immune system - IgG, immunoglobulins, IgG subclasses and Strep pneumonia vaccination\par MRI due abd/ pelvis \par s/p booster \par \par 12/2/2021\par S/p bronchoscopy with deteriorating of hemoptysis, patient was started on antibiotics, with improvement.\par He takes Symbicort, \par MRI of the abdomen/ pelvis- September - BRIDGER\par CT chest - August - stable\par Mediport flushed\par \par \par

## 2021-12-03 ENCOUNTER — RESULT REVIEW (OUTPATIENT)
Age: 56
End: 2021-12-03

## 2022-01-25 ENCOUNTER — RESULT REVIEW (OUTPATIENT)
Age: 57
End: 2022-01-25

## 2022-03-07 ENCOUNTER — RESULT REVIEW (OUTPATIENT)
Age: 57
End: 2022-03-07

## 2022-03-07 ENCOUNTER — APPOINTMENT (OUTPATIENT)
Dept: HEMATOLOGY ONCOLOGY | Facility: CLINIC | Age: 57
End: 2022-03-07
Payer: COMMERCIAL

## 2022-03-07 VITALS
TEMPERATURE: 97.3 F | BODY MASS INDEX: 27.35 KG/M2 | DIASTOLIC BLOOD PRESSURE: 70 MMHG | OXYGEN SATURATION: 97 % | HEART RATE: 78 BPM | WEIGHT: 182.56 LBS | HEIGHT: 68.7 IN | SYSTOLIC BLOOD PRESSURE: 126 MMHG | RESPIRATION RATE: 16 BRPM

## 2022-03-07 DIAGNOSIS — R53.82 CHRONIC FATIGUE, UNSPECIFIED: ICD-10-CM

## 2022-03-07 PROCEDURE — 99214 OFFICE O/P EST MOD 30 MIN: CPT

## 2022-03-07 PROCEDURE — 36415 COLL VENOUS BLD VENIPUNCTURE: CPT

## 2022-03-07 NOTE — ASSESSMENT
[FreeTextEntry1] : 57 year male with locally advanced urothelial cancer with congenital solitary kidney.\par 12/2019 Pathology report revealed in the right upper lobe fragments of papillary high grade urothelial cancer,\par - PDL-1 < 10 - negative\par \par CT chest with IV contrast Dec 20, 2019 with widespread bronchiectasis and 8 mm nodule in the RLL.\par Colonoscopy Jan 2020- BRIDGER \par MRI +/- gadolinium Dec 30, 2019 with right kidney moderate hydronephrosis, no adenopathy.  \par \par Genetic testing - [employer] VUS in BAP1 and WRN\par PD-L1- <10 CPS\par Neoadjuvant started 1/13/20 - MVACdd - s/p cycle 4/4 - 3/9/2020\par - keep mediport for now, flush q 3 months\par \par s/p MVAC x 4- Jan 17- March 10/ 2020\par \par -needs CEA\par -reviewed with patient CT scan of the chest -February 2022, following bronchial embolization for hemoptysis.  Area of collection - mucus plug vs abscess? on antibiotics now. \par - ordered MRI of the abdomen/pelvis with gadolinium, d/w Dr. Garcia to defer PETCT since patient had CT chest done already. \par - reviewed with patient current findings and indication for close monitoring\par - patient c/o severe fatigue, lack of energy- check ferritin\par \par Blood drawn in office. Ordered CMP, CBC, LDH, Uric Acid, ESR, CRP and reviewed.\par \par

## 2022-03-07 NOTE — HISTORY OF PRESENT ILLNESS
[de-identified] : Mr. 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 \par 55 year male with locally advanced urothelial cancer with congenital solitary kidney.\par \par 12/2019 Pathology report revealed in the right upper lobe fragments of papillary high grade urothelial cancer,\par - PDL-1 < 10 - negative\par \par CT chest with IV contrast Dec 20, 2019 with widespread bronchiectasis and 8 mm nodule in the RLL.\par Colonoscopy Jan 2020- BRIDGER \par MRI +/- gadolinium Dec 30, 2019 with right kidney moderate hydronephrosis, no adenopathy.  \par \par Genetic testing - [employer] VUS in BAP1 and WRN\par PD-L1- <10 CPS\par Neoadjuvant started 1/13/20 - MVACdd - s/p cycle 4/4 - 3/9/2020\par \par   [de-identified] : Patient presents today for urothelial cancer. Patient had a bronchial embolization on 1/31/2022. He states that his hemoptysis has subsided but he has been experiencing vertigo, fatigue, tinnitus and sinusitis. He has not yet seen an ENT or neurologist. His PCP saw him regarding this issue but did not refer him to another specialist. \par \par Ceftin - tx for bronchitis, changed to Augmentin. Lung improved ( bronchitis).  Diagnosed with ethmoid sinusitis. \par \par Still feels very fatigued, hemoptysis stopped.

## 2022-03-07 NOTE — REVIEW OF SYSTEMS
[Fatigue] : fatigue [Cough] : cough [Negative] : Allergic/Immunologic [Dizziness] : dizziness [Recent Change In Weight] : ~T no recent weight change [SOB on Exertion] : no shortness of breath during exertion [FreeTextEntry4] : tinnitus [FreeTextEntry5] : "cardiovascular capicity is down 30% on pelaton compared to prechemo., sobe with climbing stairs [FreeTextEntry6] : chronic cough, sobe with climbing stairs,

## 2022-03-31 ENCOUNTER — RESULT REVIEW (OUTPATIENT)
Age: 57
End: 2022-03-31

## 2022-04-11 PROBLEM — Z11.59 SCREENING FOR VIRAL DISEASE: Status: ACTIVE | Noted: 2020-11-30

## 2022-04-19 ENCOUNTER — RESULT REVIEW (OUTPATIENT)
Age: 57
End: 2022-04-19

## 2022-05-03 ENCOUNTER — RESULT REVIEW (OUTPATIENT)
Age: 57
End: 2022-05-03

## 2022-05-03 ENCOUNTER — APPOINTMENT (OUTPATIENT)
Dept: HEMATOLOGY ONCOLOGY | Facility: CLINIC | Age: 57
End: 2022-05-03
Payer: COMMERCIAL

## 2022-05-03 VITALS
HEIGHT: 68.7 IN | BODY MASS INDEX: 27.12 KG/M2 | TEMPERATURE: 97.7 F | OXYGEN SATURATION: 96 % | WEIGHT: 181 LBS | HEART RATE: 80 BPM | RESPIRATION RATE: 16 BRPM | DIASTOLIC BLOOD PRESSURE: 65 MMHG | SYSTOLIC BLOOD PRESSURE: 108 MMHG

## 2022-05-03 DIAGNOSIS — D63.1 CHRONIC KIDNEY DISEASE, STAGE 4 (SEVERE): ICD-10-CM

## 2022-05-03 DIAGNOSIS — N18.4 CHRONIC KIDNEY DISEASE, STAGE 4 (SEVERE): ICD-10-CM

## 2022-05-03 PROCEDURE — 36415 COLL VENOUS BLD VENIPUNCTURE: CPT

## 2022-05-03 PROCEDURE — 99215 OFFICE O/P EST HI 40 MIN: CPT | Mod: 25

## 2022-05-03 RX ORDER — FLUTICASONE PROPIONATE 50 MCG
SPRAY, SUSPENSION NASAL
Refills: 0 | Status: COMPLETED | COMMUNITY
End: 2022-05-03

## 2022-05-03 RX ORDER — MECLIZINE HYDROCHLORIDE 25 MG/1
TABLET ORAL
Refills: 0 | Status: COMPLETED | COMMUNITY
End: 2022-05-03

## 2022-05-03 RX ORDER — AZITHROMYCIN 250 MG/1
250 TABLET, FILM COATED ORAL
Refills: 0 | Status: COMPLETED | COMMUNITY
End: 2022-05-03

## 2022-05-03 RX ORDER — AMOXICILLIN AND CLAVULANATE POTASSIUM 875; 125 MG/1; MG/1
875-125 TABLET, COATED ORAL
Qty: 20 | Refills: 0 | Status: COMPLETED | COMMUNITY
Start: 2022-03-02 | End: 2022-05-03

## 2022-05-04 PROBLEM — N18.4 ANEMIA DUE TO STAGE 4 CHRONIC KIDNEY DISEASE: Status: ACTIVE | Noted: 2020-02-14

## 2022-05-04 NOTE — REVIEW OF SYSTEMS
[Fatigue] : fatigue [Cough] : cough [Dizziness] : dizziness [Negative] : Allergic/Immunologic [Recent Change In Weight] : ~T no recent weight change [SOB on Exertion] : no shortness of breath during exertion [FreeTextEntry4] : tinnitus [FreeTextEntry5] : "cardiovascular capicity is down 30% on pelaton compared to prechemo., sobe with climbing stairs [FreeTextEntry6] : chronic cough, sobe with climbing stairs,

## 2022-05-04 NOTE — ASSESSMENT
[FreeTextEntry1] : 57 year male with locally advanced urothelial cancer with congenital solitary kidney, registered with [facility]\par 12/2019 Pathology report revealed in the right upper lobe fragments of papillary high grade urothelial cancer,\par - PDL-1 < 10 - negative\par \par CT chest with IV contrast Dec 20, 2019 with widespread bronchiectasis and 8 mm nodule in the RLL.\par Colonoscopy Jan 2020- BRIDGER \par MRI +/- gadolinium Dec 30, 2019 with right kidney moderate hydronephrosis, no adenopathy.  \par \par Genetic testing - [employer] VUS in BAP1 and WRN\par PD-L1- <10 CPS\par Neoadjuvant started 1/13/20 - MVACdd - s/p cycle 4/4 - 3/9/2020\par - keep mediport for now, flush q 3 months\par \par s/p MVAC x 4- Jan 17- March 10/ 2020\par \par -needs CEA\par -reviewed with patient CT scan of the chest -February 2022, following bronchial embolization for hemoptysis.  Area of collection - mucus plug vs abscess? on antibiotics now. \par -  MRI of the abdomen/pelvis with gadolinium,- BRIDGER 4/22- reviewed\par Patient scheduled for urethroscopy for May 23, 2022- cytology suspicious for high grade UC \par Reviewed with patient options for treatment.  He has solitary kidney\par - option for nephrectomy and dialysis\par - He had chemotherapy 2020 with MVAc, returning to chemo - cisplatin based might be prohibitive b/o side effects\par - another option is immunotherapy, patient and his wife explained no randomized studies and risk of side effects\par - follow up after scope \par Blood drawn in office. Ordered CMP, CBC, LDH, Uric Acid, ESR, CRP and reviewed.\par \par

## 2022-05-04 NOTE — HISTORY OF PRESENT ILLNESS
[de-identified] : Mr. 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 \par 55 year male with locally advanced urothelial cancer with congenital solitary kidney.\par \par 12/2019 Pathology report revealed in the right upper lobe fragments of papillary high grade urothelial cancer,\par - PDL-1 < 10 - negative\par \par CT chest with IV contrast Dec 20, 2019 with widespread bronchiectasis and 8 mm nodule in the RLL.\par Colonoscopy Jan 2020- BRIDGER \par MRI +/- gadolinium Dec 30, 2019 with right kidney moderate hydronephrosis, no adenopathy.  \par \par Genetic testing - [employer] VUS in BAP1 and WRN\par PD-L1- <10 CPS\par Neoadjuvant started 1/13/20 - MVACdd - s/p cycle 4/4 - 3/9/2020\par \par   [de-identified] : Patient presents today for urothelial cancer. Patient had a bronchial embolization on 1/31/2022. He states that his hemoptysis has subsided but he has been experiencing tinnitus. He has not yet seen an ENT or neurologist since his issues started self resolving.  Still feels that his breathing is still strained but trying to return to normal activities.  \par \par Patient had a cystoscopy on April 19th with Dr. Garcia and received results that required clarification.  On May 23rd a uteroscopy is scheduled with Dr. Garcia.

## 2022-05-23 ENCOUNTER — TRANSCRIPTION ENCOUNTER (OUTPATIENT)
Age: 57
End: 2022-05-23

## 2022-05-23 ENCOUNTER — RESULT REVIEW (OUTPATIENT)
Age: 57
End: 2022-05-23

## 2022-06-02 ENCOUNTER — RESULT REVIEW (OUTPATIENT)
Age: 57
End: 2022-06-02

## 2022-06-02 ENCOUNTER — APPOINTMENT (OUTPATIENT)
Dept: HEMATOLOGY ONCOLOGY | Facility: CLINIC | Age: 57
End: 2022-06-02
Payer: COMMERCIAL

## 2022-06-02 VITALS
OXYGEN SATURATION: 97 % | WEIGHT: 182.06 LBS | HEIGHT: 68.7 IN | RESPIRATION RATE: 16 BRPM | DIASTOLIC BLOOD PRESSURE: 61 MMHG | HEART RATE: 82 BPM | SYSTOLIC BLOOD PRESSURE: 95 MMHG | BODY MASS INDEX: 27.28 KG/M2 | TEMPERATURE: 97.1 F

## 2022-06-02 DIAGNOSIS — Q33.4: ICD-10-CM

## 2022-06-02 PROCEDURE — 36415 COLL VENOUS BLD VENIPUNCTURE: CPT

## 2022-06-02 PROCEDURE — 99214 OFFICE O/P EST MOD 30 MIN: CPT | Mod: 25

## 2022-06-02 RX ORDER — SODIUM CHLORIDE FOR INHALATION 0.9 %
0.9 VIAL, NEBULIZER (ML) INHALATION
Qty: 300 | Refills: 0 | Status: ACTIVE | COMMUNITY
Start: 2022-05-17

## 2022-06-02 NOTE — HISTORY OF PRESENT ILLNESS
[de-identified] : Mr. 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 \par 55 year male with locally advanced urothelial cancer with congenital solitary kidney.\par \par 12/2019 Pathology report revealed in the right upper lobe fragments of papillary high grade urothelial cancer,\par - PDL-1 < 10 - negative\par \par CT chest with IV contrast Dec 20, 2019 with widespread bronchiectasis and 8 mm nodule in the RLL.\par Colonoscopy Jan 2020- BRIDGER \par MRI +/- gadolinium Dec 30, 2019 with right kidney moderate hydronephrosis, no adenopathy.  \par \par Genetic testing - [employer] VUS in BAP1 and WRN\par PD-L1- <10 CPS\par Neoadjuvant started 1/13/20 - MVACdd - s/p cycle 4/4 - 3/9/2020\par \par   [de-identified] : Patient presents today for urothelial cancer. Patient had a bronchial embolization on 1/31/2022. He states that his hemoptysis has subsided but he has been experiencing tinnitus. He has not yet seen an ENT or neurologist since his issues started self resolving.  Still feels that his breathing is still strained but trying to return to normal activities.  \par \par Patient had a cystoscopy on April 19th with Dr. Garcia and received results that required clarification.  On May 23rd a uteroscopy is scheduled with Dr. Garcia.

## 2022-06-02 NOTE — ASSESSMENT
[FreeTextEntry1] : 57 year male with locally advanced urothelial cancer with congenital solitary kidney, registered with [facility]\par 12/2019 Pathology report revealed in the right upper lobe fragments of papillary high grade urothelial cancer,\par - PDL-1 < 10 - negative\par \par CT chest with IV contrast Dec 20, 2019 with widespread bronchiectasis and 8 mm nodule in the RLL.\par Colonoscopy Jan 2020- BRIDGER \par MRI +/- gadolinium Dec 30, 2019 with right kidney moderate hydronephrosis, no adenopathy.  \par \par Genetic testing - [employer] VUS in BAP1 and WRN\par PD-L1- <10 CPS\par Neoadjuvant started 1/13/20 - MVACdd - s/p cycle 4/4 - 3/9/2020\par - keep mediport for now, flush q 3 months\par \par s/p MVAC x 4- Jan 17- March 10/ 2020\par \par -reviewed with patient CT scan of the chest -February 2022, following bronchial embolization for hemoptysis.  Area of collection - mucus plug vs abscess? on antibiotics now. \par -  MRI of the abdomen/pelvis with gadolinium,- BRIDGER 4/22- reviewed\par Patient scheduled for urethroscopy for May 23, 2022- cytology suspicious for high grade UC \par Reviewed with patient options for treatment.  He has solitary kidney\par - option for nephrectomy and dialysis\par - He had chemotherapy 2020 with MVAc, returning to chemo - cisplatin based might be prohibitive b/o side effects\par - another option is immunotherapy, patient and his wife explained no randomized studies and risk of side effects\par - follow up after scope. \par # pathology 5/23/2022- positive for high grade UCC, lesion seen in the upper pole of the kidney.  \par D/w patient and his wife options\par 1. Nephrectomy, followed by dialysis and subsequent transplant, which is the standard of care\par 2. Local therapy with mitomycin combine with pembrolizumab, reviewed side effects, including endocrinopathy, pneumonitis, colitis, dermatitis, arthritis with some of the side effects permanent.\par 3. Repeat chemotherapy - cisplatin/ gem, possible side effects - ototoxicity, neuropathy, nephropathy. \par \par D/w Dr. Garcia findings\par Patient wants to think about options \par Referred for PETCT for restaging in view of recurrence. \par \par Blood drawn in office. Ordered CMP, CBC, LDH, Uric Acid, ESR, CRP and reviewed.\par \par

## 2022-06-21 NOTE — HISTORY OF PRESENT ILLNESS
[de-identified] : Mr. 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 \par 55 year male with locally advanced urothelial cancer with congenital solitary kidney.\par \par 12/2019 Pathology report revealed in the right upper lobe fragments of papillary high grade urothelial cancer,\par - PDL-1 < 10 - negative\par \par CT chest with IV contrast Dec 20, 2019 with widespread bronchiectasis and 8 mm nodule in the RLL.\par Colonoscopy Jan 2020- BRIDGER \par MRI +/- gadolinium Dec 30, 2019 with right kidney moderate hydronephrosis, no adenopathy.  \par \par Genetic testing - [employer] VUS in BAP1 and WRN\par PD-L1- <10 CPS\par Neoadjuvant started 1/13/20 - MVACdd - s/p cycle 4/4 - 3/9/2020\par \par   [de-identified] : Patient presents today for urothelial cancer. Patient had a bronchial embolization on 1/31/2022. He states that his hemoptysis has subsided but he has been experiencing tinnitus. He has not yet seen an ENT or neurologist since his issues started self resolving.  Still feels that his breathing is still strained but trying to return to normal activities.  \par \par Patient had a cystoscopy on April 19th with Dr. Garcia and received results that required clarification.  On May 23rd a uteroscopy is scheduled with Dr. Garica.

## 2022-06-21 NOTE — REVIEW OF SYSTEMS
[Fatigue] : fatigue [Recent Change In Weight] : ~T no recent weight change [Cough] : cough [SOB on Exertion] : no shortness of breath during exertion [Dizziness] : dizziness [Negative] : Allergic/Immunologic [FreeTextEntry4] : tinnitus [FreeTextEntry5] : "cardiovascular capicity is down 30% on pelaton compared to prechemo., sobe with climbing stairs [FreeTextEntry6] : chronic cough, sobe with climbing stairs,

## 2022-06-28 ENCOUNTER — APPOINTMENT (OUTPATIENT)
Dept: HEMATOLOGY ONCOLOGY | Facility: CLINIC | Age: 57
End: 2022-06-28

## 2022-07-07 DIAGNOSIS — C68.9 MALIGNANT NEOPLASM OF URINARY ORGAN, UNSPECIFIED: ICD-10-CM

## 2022-07-07 DIAGNOSIS — D63.8 ANEMIA IN OTHER CHRONIC DISEASES CLASSIFIED ELSEWHERE: ICD-10-CM

## 2022-07-07 DIAGNOSIS — C64.9 MALIGNANT NEOPLASM OF UNSPECIFIED KIDNEY, EXCEPT RENAL PELVIS: ICD-10-CM

## 2022-07-18 ENCOUNTER — APPOINTMENT (OUTPATIENT)
Dept: HEMATOLOGY ONCOLOGY | Facility: CLINIC | Age: 57
End: 2022-07-18